# Patient Record
Sex: MALE | Race: WHITE | NOT HISPANIC OR LATINO | Employment: UNEMPLOYED | URBAN - METROPOLITAN AREA
[De-identification: names, ages, dates, MRNs, and addresses within clinical notes are randomized per-mention and may not be internally consistent; named-entity substitution may affect disease eponyms.]

---

## 2023-01-12 ENCOUNTER — HOSPITAL ENCOUNTER (EMERGENCY)
Facility: HOSPITAL | Age: 23
Discharge: HOME | End: 2023-01-12
Attending: EMERGENCY MEDICINE | Admitting: EMERGENCY MEDICINE
Payer: MEDICAID

## 2023-01-12 VITALS
TEMPERATURE: 97.9 F | BODY MASS INDEX: 28.17 KG/M2 | SYSTOLIC BLOOD PRESSURE: 122 MMHG | RESPIRATION RATE: 18 BRPM | DIASTOLIC BLOOD PRESSURE: 80 MMHG | WEIGHT: 208 LBS | HEART RATE: 89 BPM | HEIGHT: 72 IN | OXYGEN SATURATION: 99 %

## 2023-01-12 DIAGNOSIS — G40.909 SEIZURE DISORDER: Primary | ICD-10-CM

## 2023-01-12 DIAGNOSIS — F19.90 SUBSTANCE USE DISORDER: ICD-10-CM

## 2023-01-12 LAB
ALBUMIN SERPL-MCNC: 4.9 G/DL (ref 3.5–5.2)
ALBUMIN/GLOB SERPL: 2 G/DL
ALP SERPL-CCNC: 105 U/L (ref 39–117)
ALT SERPL W P-5'-P-CCNC: 24 U/L (ref 1–41)
AMPHET+METHAMPHET UR QL: NEGATIVE
ANION GAP SERPL CALCULATED.3IONS-SCNC: 11 MMOL/L (ref 5–15)
AST SERPL-CCNC: 20 U/L (ref 1–40)
BARBITURATES UR QL SCN: NEGATIVE
BASOPHILS # BLD AUTO: 0 10*3/MM3 (ref 0–0.2)
BASOPHILS NFR BLD AUTO: 0.3 % (ref 0–1.5)
BENZODIAZ UR QL SCN: NEGATIVE
BILIRUB SERPL-MCNC: 0.8 MG/DL (ref 0–1.2)
BUN SERPL-MCNC: 13 MG/DL (ref 6–20)
BUN/CREAT SERPL: 13.1 (ref 7–25)
CALCIUM SPEC-SCNC: 9.5 MG/DL (ref 8.6–10.5)
CANNABINOIDS SERPL QL: NEGATIVE
CHLORIDE SERPL-SCNC: 105 MMOL/L (ref 98–107)
CK SERPL-CCNC: 95 U/L (ref 20–200)
CO2 SERPL-SCNC: 26 MMOL/L (ref 22–29)
COCAINE UR QL: NEGATIVE
CREAT SERPL-MCNC: 0.99 MG/DL (ref 0.76–1.27)
DEPRECATED RDW RBC AUTO: 41.1 FL (ref 37–54)
EGFRCR SERPLBLD CKD-EPI 2021: 110.5 ML/MIN/1.73
EOSINOPHIL # BLD AUTO: 0 10*3/MM3 (ref 0–0.4)
EOSINOPHIL NFR BLD AUTO: 0.4 % (ref 0.3–6.2)
ERYTHROCYTE [DISTWIDTH] IN BLOOD BY AUTOMATED COUNT: 13.6 % (ref 12.3–15.4)
GLOBULIN UR ELPH-MCNC: 2.4 GM/DL
GLUCOSE SERPL-MCNC: 84 MG/DL (ref 65–99)
HCT VFR BLD AUTO: 45.7 % (ref 37.5–51)
HGB BLD-MCNC: 15.3 G/DL (ref 13–17.7)
HOLD SPECIMEN: NORMAL
LYMPHOCYTES # BLD AUTO: 3.4 10*3/MM3 (ref 0.7–3.1)
LYMPHOCYTES NFR BLD AUTO: 31.8 % (ref 19.6–45.3)
MAGNESIUM SERPL-MCNC: 1.9 MG/DL (ref 1.6–2.6)
MCH RBC QN AUTO: 29.1 PG (ref 26.6–33)
MCHC RBC AUTO-ENTMCNC: 33.4 G/DL (ref 31.5–35.7)
MCV RBC AUTO: 87 FL (ref 79–97)
METHADONE UR QL SCN: NEGATIVE
MONOCYTES # BLD AUTO: 0.6 10*3/MM3 (ref 0.1–0.9)
MONOCYTES NFR BLD AUTO: 5.6 % (ref 5–12)
NEUTROPHILS NFR BLD AUTO: 6.7 10*3/MM3 (ref 1.7–7)
NEUTROPHILS NFR BLD AUTO: 61.9 % (ref 42.7–76)
NRBC BLD AUTO-RTO: 0.1 /100 WBC (ref 0–0.2)
OPIATES UR QL: NEGATIVE
OXYCODONE UR QL SCN: NEGATIVE
PHOSPHATE SERPL-MCNC: 4.4 MG/DL (ref 2.5–4.5)
PLATELET # BLD AUTO: 228 10*3/MM3 (ref 140–450)
PMV BLD AUTO: 9 FL (ref 6–12)
POTASSIUM SERPL-SCNC: 4 MMOL/L (ref 3.5–5.2)
PROT SERPL-MCNC: 7.3 G/DL (ref 6–8.5)
RBC # BLD AUTO: 5.26 10*6/MM3 (ref 4.14–5.8)
SODIUM SERPL-SCNC: 142 MMOL/L (ref 136–145)
WBC NRBC COR # BLD: 10.8 10*3/MM3 (ref 3.4–10.8)
WHOLE BLOOD HOLD COAG: NORMAL

## 2023-01-12 PROCEDURE — 99284 EMERGENCY DEPT VISIT MOD MDM: CPT

## 2023-01-12 PROCEDURE — 80053 COMPREHEN METABOLIC PANEL: CPT | Performed by: EMERGENCY MEDICINE

## 2023-01-12 PROCEDURE — 80307 DRUG TEST PRSMV CHEM ANLYZR: CPT | Performed by: EMERGENCY MEDICINE

## 2023-01-12 PROCEDURE — 96375 TX/PRO/DX INJ NEW DRUG ADDON: CPT

## 2023-01-12 PROCEDURE — 82550 ASSAY OF CK (CPK): CPT | Performed by: EMERGENCY MEDICINE

## 2023-01-12 PROCEDURE — 25010000002 MIDAZOLAM PER 1 MG: Performed by: EMERGENCY MEDICINE

## 2023-01-12 PROCEDURE — 84100 ASSAY OF PHOSPHORUS: CPT | Performed by: EMERGENCY MEDICINE

## 2023-01-12 PROCEDURE — 25010000002 THIAMINE PER 100 MG: Performed by: EMERGENCY MEDICINE

## 2023-01-12 PROCEDURE — 83735 ASSAY OF MAGNESIUM: CPT | Performed by: EMERGENCY MEDICINE

## 2023-01-12 PROCEDURE — 85025 COMPLETE CBC W/AUTO DIFF WBC: CPT | Performed by: EMERGENCY MEDICINE

## 2023-01-12 PROCEDURE — 96374 THER/PROPH/DIAG INJ IV PUSH: CPT

## 2023-01-12 PROCEDURE — 25010000002 LEVETIRACETAM IN NACL 0.82% 500 MG/100ML SOLUTION: Performed by: EMERGENCY MEDICINE

## 2023-01-12 RX ORDER — THIAMINE HYDROCHLORIDE 100 MG/ML
100 INJECTION, SOLUTION INTRAMUSCULAR; INTRAVENOUS ONCE
Status: COMPLETED | OUTPATIENT
Start: 2023-01-12 | End: 2023-01-12

## 2023-01-12 RX ORDER — LEVETIRACETAM 5 MG/ML
500 INJECTION INTRAVASCULAR ONCE
Status: COMPLETED | OUTPATIENT
Start: 2023-01-12 | End: 2023-01-12

## 2023-01-12 RX ORDER — MIDAZOLAM HYDROCHLORIDE 1 MG/ML
1 INJECTION INTRAMUSCULAR; INTRAVENOUS ONCE
Status: COMPLETED | OUTPATIENT
Start: 2023-01-12 | End: 2023-01-12

## 2023-01-12 RX ADMIN — MIDAZOLAM 1 MG: 1 INJECTION INTRAMUSCULAR; INTRAVENOUS at 18:03

## 2023-01-12 RX ADMIN — THIAMINE HYDROCHLORIDE 100 MG: 100 INJECTION, SOLUTION INTRAMUSCULAR; INTRAVENOUS at 18:03

## 2023-01-12 RX ADMIN — SODIUM CHLORIDE 500 ML: 9 INJECTION, SOLUTION INTRAVENOUS at 18:13

## 2023-01-12 RX ADMIN — LEVETIRACETAM 500 MG: 5 INJECTION INTRAVENOUS at 18:03

## 2023-01-12 NOTE — ED NOTES
"Pt was brought in ems for two seizures reported in the [facility] ambulance pt states he was there for a NAH meeting, pt tells RN that he has been sober since dec 27th states that he has hx of seizures that he takes keppra for and he missed a dose this morning. Pt tells me he was recently inpatient at [facility] rehab and did a 30 day admission there. Pt reports an addiction to IV drugs.  Friends showed up shortly after and states that patient was medically discharged from rehab due to the seizures and was told that he can not come back until he is cleared by neurology. Pt friends state that he was only there for a week. Pt friends go on to tell me that pt has been to multiple ERs and rehabs recently and \"no one is helping him\" states that he is homeless and ultimately he is here to be placed somewhere to live. Friends at bedside state that they are recovering addicts also and live with other friends and he is not able to stay with them. Pt behavior is bazaar and seems to be manic.   "

## 2023-01-13 NOTE — DISCHARGE INSTRUCTIONS
Continue Keppra 750 mg twice a day.  Make sure you take medication regularly  No driving swimming above for level work or use of hazardous machinery until cleared by follow-up physician

## 2023-01-13 NOTE — ED PROVIDER NOTES
Subjective   History of Present Illness  22-year-old male complaining of seizures.  He states he had 2 seizures while waiting an extended period of time to be examined at [facility] today.  The patient reports he returned to normal mental status in between.  He states he missed his dose of Keppra this morning.  He states that he has not use drugs since 27 December.  He states that he did not have withdrawal.  Later other people in the room stated that the patient had been in rehab recently.  He reports no fever chills.  He reports no nausea vomiting or diarrhea.  Denies headache or Rigo's paralysis.  Denies neck pain or stiffness.  Denies photophobia.  He reports no focal neurologic deficits and states he is no longer postictal        Review of Systems   Unable to perform ROS: Psychiatric disorder       No past medical history on file.  Substance use disorder.  History of seizures not related to withdrawal  Allergies   Allergen Reactions   • Amoxicillin Anaphylaxis       No past surgical history on file.    No family history on file.    Social History     Socioeconomic History   • Marital status: Single           Objective   Physical Exam  Alert [place] Coma Scale 15 initially appeared somewhat hypomanic no evidence of active hallucination   HEENT: Pupils equal and reactive to light. Conjunctivae are not injected. Normal tympanic membranes. Oropharynx and nares are normal.   Neck: Supple. Midline trachea. No JVD. No goiter.   Chest: Clear and equal breath sounds bilaterally, regular rate and rhythm without murmur or rub.   Abdomen: Positive bowel sounds, nontender, nondistended. No rebound or peritoneal signs. No CVA tenderness.   Extremities/neuro: No focal neurologic defects identified negative Kernig or Brudzinski sign gait is normal no clubbing. cyanosis or edema. Motor sensory exam is normal. The full range of motion is intact   Skin: Warm and dry, no rashes or petechia.  The patient has recent track marks noted  in his right antecubital space no evidence of acute cellulitis or thrombophlebitis  Lymphatic: No regional lymphadenopathy. No calf pain, swelling or Homans sign    Procedures           ED Course      Labs Reviewed   CBC WITH AUTO DIFFERENTIAL - Abnormal; Notable for the following components:       Result Value    Lymphocytes, Absolute 3.40 (*)     All other components within normal limits   CK - Normal   MAGNESIUM - Normal   PHOSPHORUS - Normal   URINE DRUG SCREEN - Normal    Narrative:     Negative Thresholds Per Drugs Screened:    Amphetamines                 500 ng/ml  Barbiturates                 200 ng/ml  Benzodiazepines              100 ng/ml  Cocaine                      300 ng/ml  Methadone                    300 ng/ml  Opiates                      300 ng/ml  Oxycodone                    100 ng/ml  THC                           50 ng/ml    The Normal Value for all drugs tested is negative. This report includes final unconfirmed screening results to be used for medical treatment purposes only. Unconfirmed results must not be used for non-medical purposes such as employment or legal testing. Clinical consideration should be applied to any drug of abuse test, particularly when unconfirmed results are used.          All urine drugs of abuse requests without chain of custody are for medical screening purposes only.  False positives are possible.     COMPREHENSIVE METABOLIC PANEL    Narrative:     GFR Normal >60  Chronic Kidney Disease <60  Kidney Failure <15     CBC AND DIFFERENTIAL    Narrative:     The following orders were created for panel order CBC & Differential.  Procedure                               Abnormality         Status                     ---------                               -----------         ------                     CBC Auto Differential[[id number]]        Abnormal            Final result                 Please view results for these tests on the individual orders.   EXTRA TUBES     Narrative:     The following orders were created for panel order Extra Tubes.  Procedure                               Abnormality         Status                     ---------                               -----------         ------                     Gold Top - SST[[id number]]                                   Final result               Light Blue Top[[id number]]                                   Final result                 Please view results for these tests on the individual orders.   GOLD TOP - SST   LIGHT BLUE TOP     Medications   sodium chloride 0.9 % bolus 500 mL (0 mL Intravenous Stopped 1/12/23 1938)   thiamine (B-1) injection 100 mg (100 mg Intravenous Given 1/12/23 1803)   midazolam (VERSED) injection 1 mg (1 mg Intravenous Given 1/12/23 1803)   levETIRAcetam in NaCl 0.82% (KEPPRA) IVPB 500 mg (0 mg Intravenous Stopped 1/12/23 1829)     No radiology results for the last day                                       Medical Decision Making  The patient was advised to continue Keppra 750 twice daily and take the medication regularly.  He is cleared to return to psychiatric evaluation.    Amount and/or Complexity of Data Reviewed  Independent Historian: friend and EMS  Labs: ordered. Decision-making details documented in ED Course.      Risk  OTC drugs.  Prescription drug management.  Parenteral controlled substances.    Risk Details: The patient will be discharged.  The patient is at risk due to compliance issues and what is suspected be continued substance use.  The patient was stable at discharge and vocalized understanding of discharge instruction warnings ambulatory without difficulty differential diagnosis was reviewed and laboratory testing used to identify problems and mitigate risk        Final diagnoses:   Seizure disorder (HCC)   Substance use disorder       ED Disposition  ED Disposition     ED Disposition   Discharge    Condition   Stable    Comment   --             Seipel, Joseph F, MD  [address]  [facility] [address]  [facility] [address]  [phone]    Call   Neurologist    PATIENT CONNECTION - [facility] [address]  [phone]        [facility]  [phone]             Medication List      No changes were made to your prescriptions during this visit.          Shashi Squires MD  01/12/23 1948

## 2023-01-18 ENCOUNTER — TELEPHONE (OUTPATIENT)
Dept: NEUROLOGY | Facility: CLINIC | Age: 23
End: 2023-01-18

## 2023-01-18 NOTE — TELEPHONE ENCOUNTER
Caller: Jose Richard    Relationship to patient: Self    Best call back number: [phone]    New or established patient?  [x] New  [] Established    Date of discharge: 1/12/23    Facility discharged from: [facility]    Diagnosis/Symptoms: SZ    Length of stay (If applicable): 1 DAY    Specialty Only: Did you see a [facility] provider?    [] Yes  [x] No  If so, who? N/A    PATIENT TELEPHONED TO REQUEST HOSP F/U WITH [facility] FOR DX:SZ. PATIENT IS ATTEMPTING TO GET INTO A SOBER LIVING HOUSE. NO PREV. NEURO.     OK TO SCHEDULE? IF SO WITH WHICH PROVIDER ?    PLEASE ADVISE    THANK YOU

## 2024-05-25 ENCOUNTER — HOSPITAL ENCOUNTER (EMERGENCY)
Facility: HOSPITAL | Age: 24
Discharge: HOME | End: 2024-05-25
Attending: EMERGENCY MEDICINE
Payer: MEDICAID

## 2024-05-25 VITALS
HEART RATE: 86 BPM | HEIGHT: 72 IN | RESPIRATION RATE: 16 BRPM | DIASTOLIC BLOOD PRESSURE: 95 MMHG | TEMPERATURE: 98.7 F | BODY MASS INDEX: 23.7 KG/M2 | SYSTOLIC BLOOD PRESSURE: 139 MMHG | WEIGHT: 175 LBS | OXYGEN SATURATION: 99 %

## 2024-05-25 DIAGNOSIS — L03.211 CELLULITIS OF FACE: Primary | ICD-10-CM

## 2024-05-25 PROCEDURE — 99283 EMERGENCY DEPT VISIT LOW MDM: CPT

## 2024-05-25 RX ORDER — SULFAMETHOXAZOLE AND TRIMETHOPRIM 800; 160 MG/1; MG/1
1 TABLET ORAL 2 TIMES DAILY
Qty: 14 TABLET | Refills: 0 | Status: SHIPPED | OUTPATIENT
Start: 2024-05-25

## 2024-05-25 RX ORDER — DIAPER,BRIEF,INFANT-TODD,DISP
1 EACH MISCELLANEOUS ONCE
Status: COMPLETED | OUTPATIENT
Start: 2024-05-25 | End: 2024-05-25

## 2024-05-25 RX ORDER — SULFAMETHOXAZOLE AND TRIMETHOPRIM 800; 160 MG/1; MG/1
1 TABLET ORAL ONCE
Status: COMPLETED | OUTPATIENT
Start: 2024-05-25 | End: 2024-05-25

## 2024-05-25 RX ORDER — GINSENG 100 MG
1 CAPSULE ORAL 2 TIMES DAILY
Qty: 14 G | Refills: 0 | Status: SHIPPED | OUTPATIENT
Start: 2024-05-25

## 2024-05-25 RX ADMIN — SULFAMETHOXAZOLE AND TRIMETHOPRIM 1 TABLET: 800; 160 TABLET ORAL at 06:16

## 2024-05-25 RX ADMIN — BACITRACIN 0.9 G: 500 OINTMENT TOPICAL at 06:16

## 2024-05-25 NOTE — ED PROVIDER NOTES
"Subjective   History of Present Illness  23-year-old male describes some right upper lip pain and swelling over the last 1 day.  He states he thinks it may have been an insect bite.  He reports no fevers chills or headache or vomiting or blurry vision.  Review of Systems    No past medical history on file.  Reportedly negative  Allergies   Allergen Reactions    Amoxicillin Anaphylaxis       No past surgical history on file.    No family history on file.    Social History     Socioeconomic History    Marital status: Single       Prior to Admission medications    Medication Sig Start Date End Date Taking? Authorizing Provider   bacitracin 500 UNIT/GM ointment Apply 1 Application topically to the appropriate area as directed 2 (Two) Times a Day. 5/25/24   Ranjith Braxton MD   sulfamethoxazole-trimethoprim (BACTRIM DS,SEPTRA DS) 800-160 MG per tablet Take 1 tablet by mouth 2 (Two) Times a Day. 5/25/24   Ranjith Braxton MD     /97   Pulse 100   Temp 98.7 °F (37.1 °C) (Oral)   Resp 16   Ht 182.9 cm (72\")   Wt 79.4 kg (175 lb)   SpO2 100%   BMI 23.73 kg/m²       Objective   Physical Exam  General: Well-appearing, no acute distress  Psych: Oriented, pleasant affect  HEENT: There is a scab on the skin of the right upper lip with some surrounding soft tissue swelling and erythema, there is no skin necrosis,  there is no palpable fluctuance, there is no periorbital involvement, and intranasal exam is normal, intraoral exam is normal  Respirations: Clear, nonlabored respirations  Skin: No rash, normal color  Procedures           ED Course                                             Medical Decision Making  Patient has findings suggestive of some cellulitis in the region of the right upper lip he was ordered and prescribed bacitracin as well as Bactrim.  We discussed some other supportive care measures as well and was given warning signs for return.    Problems Addressed:  Cellulitis of face: complicated acute " illness or injury    Risk  OTC drugs.  Prescription drug management.        Final diagnoses:   Cellulitis of face       ED Disposition  ED Disposition       ED Disposition   Discharge    Condition   Stable    Comment   --               PATIENT CONNECTION - [facility]  [place] [address]  [phone]  Schedule an appointment as soon as possible for a visit in 3 days           Medication List        New Prescriptions      bacitracin 500 UNIT/GM ointment  Apply 1 Application topically to the appropriate area as directed 2 (Two) Times a Day.     sulfamethoxazole-trimethoprim 800-160 MG per tablet  Commonly known as: BACTRIM DS,SEPTRA DS  Take 1 tablet by mouth 2 (Two) Times a Day.               Where to Get Your Medications        These medications were sent to [id number] - [facility] [place] - [address] AT [address] [phone] [facility] [phone]   [address] [place] [address]      Phone: [phone]   bacitracin 500 UNIT/GM ointment  sulfamethoxazole-trimethoprim 800-160 MG per tablet            Ranjith Braxton MD  05/25/24 0611

## 2024-05-25 NOTE — DISCHARGE INSTRUCTIONS
Warm compress, start Bactrim, use antibiotic ointment.  Return for increased swelling, fever, persistent vomiting or any other concerns.